# Patient Record
Sex: MALE | Race: BLACK OR AFRICAN AMERICAN | Employment: STUDENT | URBAN - METROPOLITAN AREA
[De-identification: names, ages, dates, MRNs, and addresses within clinical notes are randomized per-mention and may not be internally consistent; named-entity substitution may affect disease eponyms.]

---
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
## 2024-04-05 ENCOUNTER — HOSPITAL ENCOUNTER (EMERGENCY)
Facility: HOSPITAL | Age: 19
Discharge: HOME | End: 2024-04-05
Attending: EMERGENCY MEDICINE
Payer: MEDICAID

## 2024-04-05 VITALS
OXYGEN SATURATION: 97 % | HEIGHT: 66 IN | TEMPERATURE: 100 F | HEART RATE: 98 BPM | BODY MASS INDEX: 29.97 KG/M2 | DIASTOLIC BLOOD PRESSURE: 76 MMHG | WEIGHT: 186.51 LBS | RESPIRATION RATE: 16 BRPM | SYSTOLIC BLOOD PRESSURE: 132 MMHG

## 2024-04-05 DIAGNOSIS — J06.9 VIRAL URI: Primary | ICD-10-CM

## 2024-04-05 DIAGNOSIS — R73.9 HYPERGLYCEMIA: ICD-10-CM

## 2024-04-05 LAB
DEPRECATED S PYO AG THROAT QL EIA: NEGATIVE
FLUAV AG NPH QL IA: NEGATIVE
FLUBV AG NOSE QL IA: NEGATIVE
GLUCOSE BLD STRIP.AUTO-MCNC: 265 MG/DL (ref 65–117)
SARS-COV-2 RDRP RESP QL NAA+PROBE: NOT DETECTED
SERVICE CMNT-IMP: ABNORMAL
SOURCE: NORMAL

## 2024-04-05 PROCEDURE — 99283 EMERGENCY DEPT VISIT LOW MDM: CPT

## 2024-04-05 PROCEDURE — 6370000000 HC RX 637 (ALT 250 FOR IP): Performed by: EMERGENCY MEDICINE

## 2024-04-05 PROCEDURE — 87070 CULTURE OTHR SPECIMN AEROBIC: CPT

## 2024-04-05 PROCEDURE — 87635 SARS-COV-2 COVID-19 AMP PRB: CPT

## 2024-04-05 PROCEDURE — 82962 GLUCOSE BLOOD TEST: CPT

## 2024-04-05 PROCEDURE — 87804 INFLUENZA ASSAY W/OPTIC: CPT

## 2024-04-05 PROCEDURE — 87880 STREP A ASSAY W/OPTIC: CPT

## 2024-04-05 RX ORDER — ACETAMINOPHEN 500 MG
1000 TABLET ORAL
Status: COMPLETED | OUTPATIENT
Start: 2024-04-05 | End: 2024-04-05

## 2024-04-05 RX ORDER — IBUPROFEN 400 MG/1
800 TABLET ORAL
Status: COMPLETED | OUTPATIENT
Start: 2024-04-05 | End: 2024-04-05

## 2024-04-05 RX ADMIN — IBUPROFEN 800 MG: 400 TABLET, FILM COATED ORAL at 16:11

## 2024-04-05 RX ADMIN — ACETAMINOPHEN 1000 MG: 500 TABLET ORAL at 16:27

## 2024-04-05 ASSESSMENT — PAIN SCALES - GENERAL
PAINLEVEL_OUTOF10: 0
PAINLEVEL_OUTOF10: 0

## 2024-04-05 ASSESSMENT — PAIN - FUNCTIONAL ASSESSMENT: PAIN_FUNCTIONAL_ASSESSMENT: 0-10

## 2024-04-05 NOTE — ED PROVIDER NOTES
[facility] EMERGENCY DEPT  EMERGENCY DEPARTMENT ENCOUNTER       Pt Name: Victor M Charlton  MRN: [id number]  Birthdate 2005  Date of evaluation: 4/5/2024  Provider: Charisma Santizo MD   PCP: Bonilla Silverio MD  Note Started: 4:17 PM EDT 4/5/24     CHIEF COMPLAINT       Chief Complaint   Patient presents with    Fever     Pt c/o fever , cough, congestion, sore throat x 1 day. Pt had ill exposure at work. Pt states he took tylenol around noon today for fevers. Pt states he also has hx of asthma    Cough    Sore Throat        HISTORY OF PRESENT ILLNESS: 1 or more elements      History From: patient, History limited by: none     Victor M Charlton is a 19 y.o. male who presents with a cc of fever, fatigue, cough beginning yesterday       Please See MDM for Additional Details of the HPI/PMH  Nursing Notes were all reviewed and agreed with or any disagreements were addressed in the HPI.     REVIEW OF SYSTEMS        Positives and Pertinent negatives as per HPI.    PAST HISTORY     Past Medical History:  Past Medical History:   Diagnosis Date    Asthma     Second hand smoke exposure     Type 2 diabetes mellitus (HCC)        Past Surgical History:  Past Surgical History:   Procedure Laterality Date    WISDOM TOOTH EXTRACTION         Family History:  Family History   Problem Relation Age of Onset    Diabetes Father     No Known Problems Mother     No Known Problems Brother     Asthma Sister        Social History:  Social History     Tobacco Use    Smoking status: Never    Smokeless tobacco: Never   Substance Use Topics    Alcohol use: Never       Allergies:  No Known Allergies    CURRENT MEDICATIONS      Discharge Medication List as of 4/5/2024  5:47 PM        CONTINUE these medications which have NOT CHANGED    Details   dulaglutide (TRULICITY) 1.5 MG/0.5ML SC injection Inject 1.5 mg into the skin every 7 daysHistorical Med             SCREENINGS               No data recorded         PHYSICAL EXAM      ED Triage Vitals [04/05/24

## 2024-04-07 LAB
BACTERIA SPEC CULT: ABNORMAL
BACTERIA SPEC CULT: ABNORMAL
SERVICE CMNT-IMP: ABNORMAL